# Patient Record
Sex: FEMALE | Race: WHITE | URBAN - METROPOLITAN AREA
[De-identification: names, ages, dates, MRNs, and addresses within clinical notes are randomized per-mention and may not be internally consistent; named-entity substitution may affect disease eponyms.]

---

## 2017-06-07 ENCOUNTER — IMPORTED ENCOUNTER (OUTPATIENT)
Dept: URBAN - METROPOLITAN AREA CLINIC 9 | Facility: CLINIC | Age: 52
End: 2017-06-07

## 2019-08-19 ENCOUNTER — IMPORTED ENCOUNTER (OUTPATIENT)
Dept: URBAN - METROPOLITAN AREA CLINIC 9 | Facility: CLINIC | Age: 54
End: 2019-08-19

## 2021-08-02 ENCOUNTER — IMPORTED ENCOUNTER (OUTPATIENT)
Dept: URBAN - METROPOLITAN AREA CLINIC 9 | Facility: CLINIC | Age: 56
End: 2021-08-02

## 2021-08-02 PROBLEM — H47.012: Noted: 2021-08-02

## 2021-08-02 PROBLEM — H11.153: Noted: 2021-08-02

## 2021-08-02 PROBLEM — H04.123: Noted: 2021-08-02

## 2021-09-20 NOTE — PATIENT DISCUSSION
The patient has mild cornea guttata, or early Fuch's endothelial dystrophy. Specular microscopy documented the cell count and morphology. At this early stage observation and serial specular microscopy are indicated.

## 2021-10-16 ASSESSMENT — TONOMETRY
OS_IOP_MMHG: 20
OS_IOP_MMHG: 12
OD_IOP_MMHG: 11
OD_IOP_MMHG: 19
OD_IOP_MMHG: 20
OS_IOP_MMHG: 18

## 2021-10-16 ASSESSMENT — KERATOMETRY
OS_K2POWER_DIOPTERS: 45.25
OD_K2POWER_DIOPTERS: 45.5
OD_AXISANGLE2_DEGREES: 172
OS_K2POWER_DIOPTERS: 45.75
OD_K1POWER_DIOPTERS: 45.25
OD_AXISANGLE_DEGREES: 94
OS_AXISANGLE_DEGREES: 72
OD_AXISANGLE2_DEGREES: 131
OD_AXISANGLE_DEGREES: 41
OD_K1POWER_DIOPTERS: 44.75
OD_AXISANGLE2_DEGREES: 4
OS_AXISANGLE2_DEGREES: 134
OS_K1POWER_DIOPTERS: 45.25
OS_AXISANGLE2_DEGREES: 180
OS_K2POWER_DIOPTERS: 45.25
OD_K1POWER_DIOPTERS: 45
OS_K1POWER_DIOPTERS: 45
OS_AXISANGLE2_DEGREES: 162
OS_AXISANGLE_DEGREES: 90
OS_K1POWER_DIOPTERS: 45.25
OD_K2POWER_DIOPTERS: 45.5
OD_AXISANGLE_DEGREES: 82
OS_AXISANGLE_DEGREES: 44
OD_K2POWER_DIOPTERS: 45.5

## 2021-10-16 ASSESSMENT — VISUAL ACUITY
OS_CC: 20/20 - SN
OD_CC: 20/20 - SN
OS_CC: 20/20 -2 SN
OS_CC: 20/25 - SN
OD_CC: 20/20 - SN
OD_CC: 20/25 - SN
OD_CC: 20/20 -2 SN
OD_CC: 20/20 - SN
OD_CC: 20/30 + SN
OS_CC: 20/25 -2 SN
OD_CC: 20/25 SN
OS_CC: 20/25 SN
OS_CC: 20/20 SN
OD_CC: 20/20 SN

## 2022-01-12 NOTE — PATIENT DISCUSSION
Would only be an epi-Lasek candidate if patient needs enhancement. Please try to refract to 20/15 after yag. Would need [facility] 30 sec with lasek.

## 2022-03-09 NOTE — PATIENT DISCUSSION
Patient informed of available non-opioid medications and other non-pharmacological options. Discussed advantages and disadvantages of the alternatives, including whether the patient is at-risk for, or has a history of, controlled substance abuse or misuse, and the patient’s personal preferences. [facility] “Opioid Alternative Pamphlet” was provided to patient.

## 2022-04-21 NOTE — PATIENT DISCUSSION
Patient informed of available non-opioid medications and other non-pharmacological options. Discussed advantages and disadvantages of the alternatives, including whether the patient is at-risk for, or has a history of, controlled substance abuse or misuse, and the patient’s personal preferences. [facility] “Opioid Alternative Pamphlet” was provided to patient.

## 2022-04-22 NOTE — PATIENT DISCUSSION
Patient informed of available non-opioid medications and other non-pharmacological options. Discussed advantages and disadvantages of the alternatives, including whether the patient is at-risk for, or has a history of, controlled substance abuse or misuse, and the patient’s personal preferences. [address] [facility] “Opioid Alternative Pamphlet” was provided to patient.

## 2022-04-29 NOTE — PATIENT DISCUSSION
Patient informed of available non-opioid medications and other non-pharmacological options. Discussed advantages and disadvantages of the alternatives, including whether the patient is at-risk for, or has a history of, controlled substance abuse or misuse, and the patient’s personal preferences. [facility] “Opioid Alternative Pamphlet” was provided to patient.

## 2022-05-13 NOTE — PATIENT DISCUSSION
Patient informed of available non-opioid medications and other non-pharmacological options. Discussed advantages and disadvantages of the alternatives, including whether the patient is at-risk for, or has a history of, controlled substance abuse or misuse, and the patient’s personal preferences. [facility] “Opioid Alternative Pamphlet” was provided to patient.

## 2022-06-17 NOTE — PATIENT DISCUSSION
Post op instructions reviewed with patients including the use of drops. Resume Flarex  BID OU and add Stuart chely Qhs OU.

## 2022-06-17 NOTE — PATIENT DISCUSSION
Patient informed of available non-opioid medications and other non-pharmacological options. Discussed advantages and disadvantages of the alternatives, including whether the patient is at-risk for, or has a history of, controlled substance abuse or misuse, and the patient’s personal preferences. [address] [facility] “Opioid Alternative Pamphlet” was provided to patient.

## 2022-07-12 NOTE — PATIENT DISCUSSION
Reassured patient that Epi-Lasek can take up to 6 months to heal in patients with prior lasik. Patient already notices improvement in vision since last visit one month ago with Dr. Sylvester Espinoza. Decrease steroid to qday for 2 weeks then stop. Restart warm compresses (patient has not been doing), hydro eye omega vitamins and one Z-pack. Switch OTC tears to preservative free.

## 2023-05-25 ENCOUNTER — ESTABLISHED PATIENT (OUTPATIENT)
Dept: URBAN - METROPOLITAN AREA CLINIC 4 | Facility: CLINIC | Age: 58
End: 2023-05-25

## 2023-05-25 DIAGNOSIS — H47.012: ICD-10-CM

## 2023-05-25 DIAGNOSIS — H04.123: ICD-10-CM

## 2023-05-25 DIAGNOSIS — H43.393: ICD-10-CM

## 2023-05-25 DIAGNOSIS — H11.153: ICD-10-CM

## 2023-05-25 PROCEDURE — 92133 CPTRZD OPH DX IMG PST SGM ON: CPT

## 2023-05-25 PROCEDURE — 92015 DETERMINE REFRACTIVE STATE: CPT

## 2023-05-25 PROCEDURE — 92014 COMPRE OPH EXAM EST PT 1/>: CPT

## 2023-05-25 ASSESSMENT — KERATOMETRY
OD_AXISANGLE2_DEGREES: 170
OS_K2POWER_DIOPTERS: 45.50
OS_AXISANGLE_DEGREES: 78
OD_K1POWER_DIOPTERS: 44.75
OS_K1POWER_DIOPTERS: 44.50
OS_AXISANGLE2_DEGREES: 168
OD_AXISANGLE_DEGREES: 80
OD_K2POWER_DIOPTERS: 45.5

## 2023-05-25 ASSESSMENT — TONOMETRY
OS_IOP_MMHG: 18
OD_IOP_MMHG: 17

## 2023-05-25 ASSESSMENT — VISUAL ACUITY
OD_GLARE: 20/70
OU_CC: 20/30
OS_GLARE: 20/50
OS_CC: 20/40+2
OD_CC: 20/30

## 2023-05-30 ENCOUNTER — ESTABLISHED PATIENT (OUTPATIENT)
Dept: URBAN - METROPOLITAN AREA CLINIC 18 | Facility: CLINIC | Age: 58
End: 2023-05-30

## 2023-05-30 DIAGNOSIS — H43.823: ICD-10-CM

## 2023-05-30 DIAGNOSIS — H43.393: ICD-10-CM

## 2023-05-30 DIAGNOSIS — H33.322: ICD-10-CM

## 2023-05-30 PROCEDURE — 92201 OPSCPY EXTND RTA DRAW UNI/BI: CPT

## 2023-05-30 PROCEDURE — 92134 CPTRZ OPH DX IMG PST SGM RTA: CPT

## 2023-05-30 PROCEDURE — 92014 COMPRE OPH EXAM EST PT 1/>: CPT

## 2023-05-30 ASSESSMENT — KERATOMETRY
OD_AXISANGLE_DEGREES: 80
OD_K1POWER_DIOPTERS: 44.75
OS_K2POWER_DIOPTERS: 45.50
OS_AXISANGLE_DEGREES: 78
OD_AXISANGLE2_DEGREES: 170
OD_K2POWER_DIOPTERS: 45.5
OS_AXISANGLE2_DEGREES: 168
OS_K1POWER_DIOPTERS: 44.50

## 2023-05-30 ASSESSMENT — TONOMETRY
OS_IOP_MMHG: 19
OD_IOP_MMHG: 19

## 2023-05-30 ASSESSMENT — VISUAL ACUITY
OS_CC: 20/20-2
OD_CC: 20/20-2

## 2024-01-01 NOTE — PATIENT DISCUSSION
Residual STACEY OU. Refraction stable. PO with Dr. Margarita Tan for probable YAG then re-check for LASIK OU. 10

## 2025-01-08 ENCOUNTER — COMPREHENSIVE EXAM (OUTPATIENT)
Age: 60
End: 2025-01-08

## 2025-01-08 DIAGNOSIS — H43.823: ICD-10-CM

## 2025-01-08 DIAGNOSIS — H11.153: ICD-10-CM

## 2025-01-08 DIAGNOSIS — H04.123: ICD-10-CM

## 2025-01-08 DIAGNOSIS — H25.13: ICD-10-CM

## 2025-01-08 DIAGNOSIS — H33.322: ICD-10-CM

## 2025-01-08 DIAGNOSIS — H47.012: ICD-10-CM

## 2025-01-08 DIAGNOSIS — H43.393: ICD-10-CM

## 2025-01-08 PROCEDURE — 92015 DETERMINE REFRACTIVE STATE: CPT

## 2025-01-08 PROCEDURE — 92014 COMPRE OPH EXAM EST PT 1/>: CPT

## 2025-01-16 ENCOUNTER — PRE-OP/H&P (OUTPATIENT)
Age: 60
End: 2025-01-16

## 2025-01-16 DIAGNOSIS — H25.13: ICD-10-CM

## 2025-01-16 PROCEDURE — 92136 OPHTHALMIC BIOMETRY: CPT

## 2025-01-16 PROCEDURE — 99211PRE PRE OP VISIT

## 2025-01-28 NOTE — PATIENT DISCUSSION
Discussed the difficulty of selecting IOL's in previous refractive surgery patients and discussed the possibility of refractive surprise and possible IOL exchange. Rudolph Reed presents today for   Chief Complaint   Patient presents with    Follow-up     Overdue f/u        Rudolph Reed preferred language for health care discussion is english/other.    Is someone accompanying this pt? no    Is the patient using any DME equipment during OV? no    Depression Screening:  Depression: Not at risk (1/28/2025)    PHQ-2     PHQ-2 Score: 0        Learning Assessment:  Who is the primary learner? Patient    What is the preferred language for health care of the primary learner? ENGLISH    How does the primary learner prefer to learn new concepts? DEMONSTRATION    Answered By patient    Relationship to Learner SELF           Pt currently taking Anticoagulant therapy? no    Pt currently taking Antiplatelet therapy ? ASA 81 mg QD and Brilinta 90 mg BID       Coordination of Care:  1. Have you been to the ER, urgent care clinic since your last visit? Hospitalized since your last visit? no    2. Have you seen or consulted any other health care providers outside of the [facility] since your last visit? Include any pap smears or colon screening. no

## 2025-02-04 ENCOUNTER — SURGERY/PROCEDURE (OUTPATIENT)
Age: 60
End: 2025-02-04

## 2025-02-04 DIAGNOSIS — H25.13: ICD-10-CM

## 2025-02-04 PROCEDURE — 99199PAV PROF ADVANCED VISION PACKAGE

## 2025-02-04 PROCEDURE — 66984AV REMOVE CATARACT, INSERT ADVANCED LENS

## 2025-02-05 ENCOUNTER — POST-OP (OUTPATIENT)
Age: 60
End: 2025-02-05

## 2025-02-05 DIAGNOSIS — Z96.1: ICD-10-CM

## 2025-02-05 PROCEDURE — 99024 POSTOP FOLLOW-UP VISIT: CPT

## 2025-02-19 ENCOUNTER — POST OP/EVAL OF SECOND EYE (OUTPATIENT)
Age: 60
End: 2025-02-19

## 2025-02-19 DIAGNOSIS — H25.11: ICD-10-CM

## 2025-02-19 DIAGNOSIS — Z96.1: ICD-10-CM

## 2025-03-18 ENCOUNTER — SURGERY/PROCEDURE (OUTPATIENT)
Age: 60
End: 2025-03-18

## 2025-03-18 DIAGNOSIS — H25.13: ICD-10-CM

## 2025-03-18 PROBLEM — H11.153: Noted: 2021-08-02

## 2025-03-18 PROBLEM — H47.012: Noted: 2021-08-02

## 2025-03-18 PROBLEM — Z96.1: Noted: 2025-03-18

## 2025-03-18 PROBLEM — H04.123: Noted: 2021-08-02

## 2025-03-18 PROCEDURE — 66984AV REMOVE CATARACT, INSERT ADVANCED LENS: Mod: 79,RT

## 2025-03-18 PROCEDURE — 99199PAV PROF ADVANCED VISION PACKAGE

## 2025-03-19 ENCOUNTER — POST-OP (OUTPATIENT)
Age: 60
End: 2025-03-19

## 2025-03-19 DIAGNOSIS — Z96.1: ICD-10-CM

## 2025-03-19 PROCEDURE — 99024 POSTOP FOLLOW-UP VISIT: CPT

## 2025-04-01 ENCOUNTER — POST-OP (OUTPATIENT)
Age: 60
End: 2025-04-01

## 2025-04-01 DIAGNOSIS — Z96.1: ICD-10-CM

## 2025-04-01 PROCEDURE — 99024 POSTOP FOLLOW-UP VISIT: CPT
